# Patient Record
Sex: FEMALE | Race: WHITE
[De-identification: names, ages, dates, MRNs, and addresses within clinical notes are randomized per-mention and may not be internally consistent; named-entity substitution may affect disease eponyms.]

---

## 2018-03-15 ENCOUNTER — HOSPITAL ENCOUNTER (OUTPATIENT)
Dept: HOSPITAL 17 - HCAV | Age: 63
End: 2018-03-15
Attending: FAMILY MEDICINE
Payer: COMMERCIAL

## 2018-03-15 DIAGNOSIS — R94.31: ICD-10-CM

## 2018-03-15 DIAGNOSIS — R06.09: Primary | ICD-10-CM

## 2018-03-15 PROCEDURE — 93005 ELECTROCARDIOGRAM TRACING: CPT

## 2018-03-15 PROCEDURE — 71046 X-RAY EXAM CHEST 2 VIEWS: CPT

## 2018-03-15 NOTE — RADRPT
EXAM DATE/TIME:  03/15/2018 09:26 

 

[facility] COMPARISON:     

No previous studies available for comparison.

 

                     

INDICATIONS :     

Short of breath with exertion.

                     

 

MEDICAL HISTORY :     

Hypertension.          

 

SURGICAL HISTORY :     

None.   

 

ENCOUNTER:     

Initial                                        

 

ACUITY:     

>1 year      

 

PAIN SCORE:     

0/10

 

LOCATION:     

Bilateral chest 

 

FINDINGS:     

PA and lateral views of the chest demonstrate the lungs to be symmetrically aerated without evidence 
of mass, infiltrate or effusion.  The cardiomediastinal contours are unremarkable.  Osseous structure
s are intact.

 

CONCLUSION:     

1. No acute cardiopulmonary disease.

 

 

 

 Alcides Warner MD on March 15, 2018 at 9:53           

Board Certified Radiologist.

 This report was verified electronically.

## 2018-03-15 NOTE — EKG
Date Performed: 03/15/2018       Time Performed: 09:05:40

 

PTAGE:      62 years

 

EKG:      SINUS BRADYCARDIA POSSIBLE LEFT ATRIAL ENLARGEMENT INCOMPLETE RIGHT BUNDLE BRANCH BLOCK MOD
ERATE T-WAVE ABNORMALITY, CONSIDER ANTERIOR ISCHEMIA Probable LVH Clinical correlation will be import
ant ABNORMAL ECG 

 

NO PREVIOUS TRACING             FOR COMPARISON

 

DOCTOR:   Roya Green  Interpretating Date/Time  03/15/2018 13:42:38